# Patient Record
Sex: FEMALE | Race: WHITE | URBAN - METROPOLITAN AREA
[De-identification: names, ages, dates, MRNs, and addresses within clinical notes are randomized per-mention and may not be internally consistent; named-entity substitution may affect disease eponyms.]

---

## 2017-03-28 ENCOUNTER — OFFICE VISIT (OUTPATIENT)
Dept: INTERNAL MEDICINE CLINIC | Age: 27
End: 2017-03-28

## 2017-03-28 VITALS
HEART RATE: 77 BPM | SYSTOLIC BLOOD PRESSURE: 100 MMHG | OXYGEN SATURATION: 93 % | RESPIRATION RATE: 19 BRPM | DIASTOLIC BLOOD PRESSURE: 70 MMHG | BODY MASS INDEX: 20.06 KG/M2 | WEIGHT: 124.8 LBS | TEMPERATURE: 98.3 F | HEIGHT: 66 IN

## 2017-03-28 DIAGNOSIS — Z11.3 SCREEN FOR STD (SEXUALLY TRANSMITTED DISEASE): Primary | ICD-10-CM

## 2017-03-28 DIAGNOSIS — N92.6 ABNORMAL MENSES: ICD-10-CM

## 2017-03-28 DIAGNOSIS — Z11.3 SCREEN FOR STD (SEXUALLY TRANSMITTED DISEASE): ICD-10-CM

## 2017-03-28 NOTE — MR AVS SNAPSHOT
Visit Information Date & Time Provider Department Dept. Phone Encounter #  
 3/28/2017  8:30 AM [facility] [phone] [id number] Follow-up Instructions Return in about 6 months (around 9/28/2017) for Full Physical - 30 minutes appointment. Upcoming Health Maintenance Date Due  
 PAP AKA CERVICAL CYTOLOGY 2/9/2009 DTaP/Tdap/Td series (2 - Td) 2/11/2019 Allergies as of 3/28/2017  Review Complete On: 3/28/2017 By: Haydee Machado MD  
 Not on File Current Immunizations  Never Reviewed No immunizations on file. Not reviewed this visit You Were Diagnosed With   
  
 Codes Comments Screen for STD (sexually transmitted disease)    -  Primary ICD-10-CM: Z11.3 ICD-9-CM: V74.5 Abnormal menses     ICD-10-CM: N92.6 ICD-9-CM: 626.9 Vitals BP Pulse Temp Resp Height(growth percentile) Weight(growth percentile) 100/70 (BP 1 Location: Left arm, BP Patient Position: Sitting) 77 98.3 °F (36.8 °C) (Oral) 19 5' 6.25\" (1.683 m) 124 lb 12.8 oz (56.6 kg) LMP SpO2 BMI OB Status Smoking Status 03/26/2017 93% 19.99 kg/m2 Unknown Never Smoker Vitals History BMI and BSA Data Body Mass Index Body Surface Area  
 19.99 kg/m 2 1.63 m 2 Preferred Pharmacy Pharmacy Name Phone [id number], [place] - [address] [phone] Your Updated Medication List  
  
Notice  As of 3/28/2017  9:18 AM  
 You have not been prescribed any medications. We Performed the Following CBC WITH AUTOMATED DIFF [16765 CPT(R)] HSV-2 AB, IGG GLYCOPROTEIN, G-SPECIFIC [id number] CPT(R)] LIPID PANEL [17157 CPT(R)] METABOLIC PANEL, COMPREHENSIVE [33820 CPT(R)] REFERRAL TO GYNECOLOGY [REF30 Custom] RPR [20741 CPT(R)] TSH 3RD GENERATION [72331 CPT(R)] Follow-up Instructions Return in about 6 months (around 9/28/2017) for Full Physical - 30 minutes appointment. To-Do List   
 03/28/2017 Lab:  HIV 1/2 AB SCREEN W RFLX CONFIRM Referral Information Referral ID Referred By Referred To  
  
 [address] [facility], MD [address] [facility], [address] Phone: [phone] Fax: [phone] Visits Status Start Date End Date 1 New Request 3/28/17 3/28/18 If your referral has a status of pending review or denied, additional information will be sent to support the outcome of this decision. Introducing [facility]! [facility] introduces ID AMERICA patient portal. Now you can access parts of your medical record, email your doctor's office, and request medication refills online. 1. In your internet browser, go to [email or web address] 2. Click on the First Time User? Click Here link in the Sign In box. You will see the New Member Sign Up page. 3. Enter your ID AMERICA Access Code exactly as it appears below. You will not need to use this code after youve completed the sign-up process. If you do not sign up before the expiration date, you must request a new code. · ID [employer] Access Code: [email or web address] Expires: 6/26/2017  9:15 AM 
 
4. Enter the last four digits of your Social Security Number (xxxx) and Date of Birth (mm/dd/yyyy) as indicated and click Submit. You will be taken to the next sign-up page. 5. Create a Therasist ID. This will be your Therasist login ID and cannot be changed, so think of one that is secure and easy to remember. 6. Create a Therasist password. You can change your password at any time. 7. Enter your Password Reset Question and Answer. This can be used at a later time if you forget your password. 8. Enter your e-mail address. You will receive e-mail notification when new information is available in [address] 9. Click Sign Up. You can now view and download portions of your medical record. 10. Click the Download Summary menu link to download a portable copy of your medical information. If you have questions, please visit the Frequently Asked Questions section of the [employer] website. Remember, [employer]orTx is NOT to be used for urgent needs. For medical emergencies, dial 911. Now available from your iPhone and Android! Please provide this summary of care documentation to your next provider. Your primary care clinician is listed as Fransico Michel. If you have any questions after today's visit, please call [phone].

## 2017-03-28 NOTE — PROGRESS NOTES
Follow Up Visit    Juaquin Glass is a 32 y.o. female. she presents for follow up care    The patient is in a new relationship now and is asking for STD testing. She is getting a divorce and is feeling better now from a mood perspective. She feels that some of her previous mood issues were as a consequence of her relationship with her soon to be ex-. She has not seen gyn recently as her last Gyn provider retired. Her abnormal menstruation has improved. Patient Active Problem List   Diagnosis Code    Grief reaction F43.20    Abnormal menses N92.6         Health Maintenance   Topic Date Due    DTaP/Tdap/Td series (1 - Tdap) 02/09/2009    PAP AKA CERVICAL CYTOLOGY  02/09/2009    INFLUENZA AGE 9 TO ADULT  08/01/2016       Review of Systems   Constitutional: Negative. Respiratory: Negative. Cardiovascular: Negative. Gastrointestinal: Negative. Visit Vitals    /70 (BP 1 Location: Left arm, BP Patient Position: Sitting)    Pulse 77    Temp 98.3 °F (36.8 °C) (Oral)    Resp 19    Ht 5' 6.25\" (1.683 m)    Wt 124 lb 12.8 oz (56.6 kg)    LMP 03/26/2017    SpO2 93%    BMI 19.99 kg/m2       Physical Exam   Constitutional: She appears well-developed and well-nourished. Cardiovascular: Normal rate and regular rhythm. Pulmonary/Chest: Effort normal and breath sounds normal.         ASSESSMENT/PLAN    Diagnoses and all orders for this visit:    Screen for STD (sexually transmitted disease)  -     RPR  -     HIV 1/2 AB SCREEN W RFLX CONFIRM - [facility] Only; Future  -     HSV-2 AB, IGG GLYCOPROTEIN, G-SPECIFIC    Abnormal menses  -     REFERRAL TO GYNECOLOGY  -     CBC WITH AUTOMATED DIFF  -     LIPID PANEL  -     TSH 3RD GENERATION  -     METABOLIC PANEL, COMPREHENSIVE      Follow-up Disposition:  Return in about 6 months (around 9/28/2017) for Full Physical - 30 minutes appointment.

## 2017-03-28 NOTE — PROGRESS NOTES
Patient is here today requesting a full STD examination; patient stated meeting someone special and they both are getting tested before they take their relationship to the next level.

## 2017-03-29 ENCOUNTER — OFFICE VISIT (OUTPATIENT)
Dept: OBGYN CLINIC | Age: 27
End: 2017-03-29

## 2017-03-29 VITALS — WEIGHT: 127 LBS | BODY MASS INDEX: 20.34 KG/M2 | DIASTOLIC BLOOD PRESSURE: 66 MMHG | SYSTOLIC BLOOD PRESSURE: 118 MMHG

## 2017-03-29 DIAGNOSIS — Z30.430 ENCOUNTER FOR IUD INSERTION: ICD-10-CM

## 2017-03-29 DIAGNOSIS — Z01.419 ENCOUNTER FOR ANNUAL ROUTINE GYNECOLOGICAL EXAMINATION: Primary | ICD-10-CM

## 2017-03-29 NOTE — LETTER
3/29/2017 3:54 PM 
 
Ms. [place] [address], [place] 
[address] [place] [address] [phone] [id number] A gynecological exam was performed today. Exam was normal, no abnormal findings. Sincerely, Nae Dutta MD

## 2017-03-29 NOTE — PROGRESS NOTES
Annual exam ages 21-44    [address] is a No obstetric history on file. ,  32 y.o. female [facility] Patient's last menstrual period was 03/22/2017 (approximate). .    She presents for her annual checkup. She is having no significant problems. She is here due to a new relationship and requests exam.  Finalizing a divorce; no kids. With regard to the Gardasil vaccine, she is older than the FDA approved age to receive it. Menstrual status:    Her periods are moderate in flow. She is using one to two pads or tampons per day, usually regular and last 26-30 days. She denies dysmenorrhea. She reports no premenstrual symptoms. Contraception:    The current method of family planning is none. Sexual history:     She  reports that she currently engages in sexual activity and has had male partners. She reports using the following method of birth control/protection: Condom. .    Medical conditions:    She does not recall her last pap smear. Breast Cancer History/Substance Abuse: negative    Past Medical History:   Diagnosis Date    Anxiety     Stress      No past surgical history on file. Allergies: Review of patient's allergies indicates no known allergies. Tobacco History:  reports that she has never smoked. She has never used smokeless tobacco.  Alcohol Abuse:  reports that she drinks alcohol. Drug Abuse:  reports that she does not use illicit drugs.     Family Medical/Cancer History:   Family History   Problem Relation Age of Onset    Headache Father     Hypertension Father     Depression Father     Heart Disease Maternal Grandmother         Review of Systems - History obtained from the patient  Constitutional: negative for weight loss, fever, night sweats  HEENT: negative for hearing loss, earache, congestion, snoring, sorethroat  CV: negative for chest pain, palpitations, edema  Resp: negative for cough, shortness of breath, wheezing  GI: negative for change in bowel habits, abdominal pain, black or bloody stools  : negative for frequency, dysuria, hematuria, vaginal discharge  MSK: negative for back pain, joint pain, muscle pain  Breast: negative for breast lumps, nipple discharge, galactorrhea  Skin :negative for itching, rash, hives  Neuro: negative for dizziness, headache, confusion, weakness  Psych: negative for anxiety, depression, change in mood  Heme/lymph: negative for bleeding, bruising, pallor    Physical Exam    Visit Vitals    /66    Wt 127 lb (57.6 kg)    LMP 03/22/2017 (Approximate)    BMI 20.34 kg/m2       Constitutional  · Appearance: well-nourished, well developed, alert, in no acute distress    HENT  · Head and Face: appears normal    Neck  · Inspection/Palpation: normal appearance, no masses or tenderness  · Lymph Nodes: no lymphadenopathy present  · Thyroid: gland size normal, nontender, no nodules or masses present on palpation    Chest  · Respiratory Effort: breathing unlabored    Breasts  · Inspection of Breasts: breasts symmetrical, no skin changes, no discharge present, nipple appearance normal, no skin retraction present  · Palpation of Breasts and Axillae: no masses present on palpation, no breast tenderness  · Axillary Lymph Nodes: no lymphadenopathy present    Gastrointestinal  · Abdominal Examination: abdomen non-tender to palpation, normal bowel sounds, no masses present  · Liver and spleen: no hepatomegaly present, spleen not palpable  · Hernias: no hernias identified    Genitourinary  · External Genitalia: normal appearance for age, no discharge present, no tenderness present, no inflammatory lesions present, no masses present, no atrophy present  · Vagina: normal vaginal vault without central or paravaginal defects, no discharge present, no inflammatory lesions present, no masses present  · Bladder: non-tender to palpation  · Urethra: appears normal  · Cervix: normal   · Uterus: normal size, shape and consistency  · Adnexa: no adnexal tenderness present, no adnexal masses present  · Perineum: perineum within normal limits, no evidence of trauma, no rashes or skin lesions present  · Anus: anus within normal limits, no hemorrhoids present  · Inguinal Lymph Nodes: no lymphadenopathy present    Skin  · General Inspection: no rash, no lesions identified    Neurologic/Psychiatric  · Mental Status:  · Orientation: grossly oriented to person, place and time  · Mood and Affect: mood normal, affect appropriate      Kyleena IUD INSERTION  Indications:  Flakita Mcclendon is a No obstetric history on file. ,  32 y.o. female [facility] Patient's last menstrual period was 03/22/2017 (approximate). Her LMP was normal in duration and amount of flow. She  presents for insertion of an IUD. The risks, benefits and alternatives of IUD insertion were discussed in detail at last visit. She also has reviewed Mirena information. She has elected to proceed with the insertion today and she states she has no further questions. No components found for: SPEP, UPEP  Procedure: The pelvic exam revealed normal external genitalia. On bimanual exam the uterus was anteverted and normal in size with no tenderness present. A speculum was inserted into the vagina and the cervix was visualized. The cervix was prepped with zephiran solution. The anterior lip of the cervix was grasped with a single toothed tenaculum. The uterus was sounded with a Dasilva sound to 7 centimeters. A Mirena was then inserted without difficulty. The string was cut to 3 centimeters. She experienced a mild  amount of cramping. Post Procedure Status:   She tolerated the procedure with minimal discomfort. The patient was observed for 5 minutes after the insertion. There were no complications. Patient was discharged in stable condition. The patient received Kyleena lot number [id number]  .   Assessment:  Routine gynecologic examination  Her current medical status is satisfactory with no evidence of significant gynecologic issues. Kyleena insertion    Plan:  Counseled re: diet, exercise, healthy lifestyle  Return for yearly wellness visits  Gardisil counseling provided  Rec screening mammo at 34  Desires GC/c testing w pap  Discussed contraception options and wants to proceed w Qasim Zayra IUD; fully consented.   Patient on menses and hasn't been sexually active in over 6m

## 2017-03-31 LAB
C TRACH RRNA CVX QL NAA+PROBE: NEGATIVE
CYTOLOGIST CVX/VAG CYTO: NORMAL
CYTOLOGY CVX/VAG DOC THIN PREP: NORMAL
DX ICD CODE: NORMAL
HPV I/H RISK 1 DNA CVX QL PROBE+SIG AMP: NEGATIVE
Lab: NORMAL
N GONORRHOEA RRNA CVX QL NAA+PROBE: NEGATIVE
OTHER STN SPEC: NORMAL
PATH REPORT.FINAL DX SPEC: NORMAL
STAT OF ADQ CVX/VAG CYTO-IMP: NORMAL
T VAGINALIS RRNA SPEC QL NAA+PROBE: NEGATIVE

## 2017-04-03 LAB
ALBUMIN SERPL-MCNC: 4.2 G/DL (ref 3.5–5.5)
ALBUMIN/GLOB SERPL: 1.9 {RATIO} (ref 1.2–2.2)
ALP SERPL-CCNC: 49 IU/L (ref 39–117)
ALT SERPL-CCNC: 16 IU/L (ref 0–32)
AST SERPL-CCNC: 19 IU/L (ref 0–40)
BASOPHILS # BLD AUTO: 0 X10E3/UL (ref 0–0.2)
BASOPHILS NFR BLD AUTO: 0 %
BILIRUB SERPL-MCNC: 0.2 MG/DL (ref 0–1.2)
BUN SERPL-MCNC: 13 MG/DL (ref 6–20)
BUN/CREAT SERPL: 17 (ref 8–20)
CALCIUM SERPL-MCNC: 9.3 MG/DL (ref 8.7–10.2)
CHLORIDE SERPL-SCNC: 104 MMOL/L (ref 96–106)
CHOLEST SERPL-MCNC: 153 MG/DL (ref 100–199)
CO2 SERPL-SCNC: 25 MMOL/L (ref 18–29)
CREAT SERPL-MCNC: 0.76 MG/DL (ref 0.57–1)
EOSINOPHIL # BLD AUTO: 0.1 X10E3/UL (ref 0–0.4)
EOSINOPHIL NFR BLD AUTO: 1 %
ERYTHROCYTE [DISTWIDTH] IN BLOOD BY AUTOMATED COUNT: 13.3 % (ref 12.3–15.4)
GLOBULIN SER CALC-MCNC: 2.2 G/DL (ref 1.5–4.5)
GLUCOSE SERPL-MCNC: 83 MG/DL (ref 65–99)
HCT VFR BLD AUTO: 37.2 % (ref 34–46.6)
HDLC SERPL-MCNC: 74 MG/DL
HGB BLD-MCNC: 12.1 G/DL (ref 11.1–15.9)
HIV 1+2 AB+HIV1 P24 AG SERPL QL IA: NON REACTIVE
HSV2 IGG SER IA-ACNC: <0.91 INDEX (ref 0–0.9)
IMM GRANULOCYTES # BLD: 0 X10E3/UL (ref 0–0.1)
IMM GRANULOCYTES NFR BLD: 0 %
LDLC SERPL CALC-MCNC: 67 MG/DL (ref 0–99)
LYMPHOCYTES # BLD AUTO: 1.7 X10E3/UL (ref 0.7–3.1)
LYMPHOCYTES NFR BLD AUTO: 34 %
MCH RBC QN AUTO: 29.4 PG (ref 26.6–33)
MCHC RBC AUTO-ENTMCNC: 32.5 G/DL (ref 31.5–35.7)
MCV RBC AUTO: 90 FL (ref 79–97)
MONOCYTES # BLD AUTO: 0.4 X10E3/UL (ref 0.1–0.9)
MONOCYTES NFR BLD AUTO: 9 %
NEUTROPHILS # BLD AUTO: 2.9 X10E3/UL (ref 1.4–7)
NEUTROPHILS NFR BLD AUTO: 56 %
PLATELET # BLD AUTO: 197 X10E3/UL (ref 150–379)
POTASSIUM SERPL-SCNC: 3.9 MMOL/L (ref 3.5–5.2)
PROT SERPL-MCNC: 6.4 G/DL (ref 6–8.5)
RBC # BLD AUTO: 4.12 X10E6/UL (ref 3.77–5.28)
RPR SER QL: NON REACTIVE
SODIUM SERPL-SCNC: 142 MMOL/L (ref 134–144)
TRIGL SERPL-MCNC: 58 MG/DL (ref 0–149)
TSH SERPL DL<=0.005 MIU/L-ACNC: 1.75 UIU/ML (ref 0.45–4.5)
VLDLC SERPL CALC-MCNC: 12 MG/DL (ref 5–40)
WBC # BLD AUTO: 5.1 X10E3/UL (ref 3.4–10.8)

## 2017-04-17 ENCOUNTER — OFFICE VISIT (OUTPATIENT)
Dept: OBGYN CLINIC | Age: 27
End: 2017-04-17

## 2017-04-17 VITALS — SYSTOLIC BLOOD PRESSURE: 112 MMHG | DIASTOLIC BLOOD PRESSURE: 64 MMHG

## 2017-04-17 DIAGNOSIS — Z97.5 IUD (INTRAUTERINE DEVICE) IN PLACE: Primary | ICD-10-CM

## 2017-04-17 NOTE — PROGRESS NOTES
IUD followup note    This is a follow-up visit for Hardik Wilson is a No obstetric history on file. ,  32 y.o. female [facility] Patient's last menstrual period was 03/22/2017 (approximate). .     She had an Mirena IUD placed a couple of weeks ago. Having some spotting. Has questions about HSV 1; reviewed finding of her new partner; reassured. Some spotting but no significant cramping    Past Medical History:   Diagnosis Date    Anxiety     Stress      No past surgical history on file. Social History     Occupational History    Not on file.      Social History Main Topics    Smoking status: Never Smoker    Smokeless tobacco: Never Used    Alcohol use Yes      Comment: occassional drinker    Drug use: No    Sexual activity: Yes     Partners: Male     Birth control/ protection: Condom     Family History   Problem Relation Age of Onset    Headache Father     Hypertension Father     Depression Father     Heart Disease Maternal Grandmother        No Known Allergies  Prior to Admission medications    Not on File        Review of Systems: History obtained from the patient  Constitutional: negative for weight loss, fever, night sweats  Breast: negative for breast lumps, nipple discharge, galactorrhea  GI: negative for change in bowel habits, abdominal pain, black or bloody stools  : negative for frequency, dysuria, hematuria, vaginal discharge  MSK: negative for back pain, joint pain, muscle pain  Skin: negative for itching, rash, hives  Psych: negative for anxiety, depression, change in mood      Objective:  Visit Vitals    /64    LMP 03/22/2017 (Approximate)       Physical Exam:   PHYSICAL EXAMINATION    Constitutional  · Appearance: well-nourished, well developed, alert, in no acute distress    Gastrointestinal  · Abdominal Examination: abdomen non-tender to palpation, normal bowel sounds, no masses present  · Liver and spleen: no hepatomegaly present, spleen not palpable  · Hernias: no hernias identified    Genitourinary  · External Genitalia: normal appearance for age, no discharge present, no tenderness present, no inflammatory lesions present, no masses present, no atrophy present  · Vagina: normal vaginal vault without central or paravaginal defects, no discharge present, no inflammatory lesions present, no masses present  · Bladder: non-tender to palpation  · Urethra: appears normal  · Cervix: normal with IUD string visible and appropriate length   · Uterus: normal size, shape and consistency  · Adnexa: no adnexal tenderness present, no adnexal masses present  · Perineum: perineum within normal limits, no evidence of trauma, no rashes or skin lesions present    Skin  · General Inspection: no rash, no lesions identified    Neurologic/Psychiatric  · Mental Status:  · Orientation: grossly oriented to person, place and time  · Mood and Affect: mood normal, affect appropriate    Assessment:  Doing well with expected mild irregular bleeding. IUD in appropriate position      Plan:   RTO for AE as scheduled.